# Patient Record
Sex: FEMALE | Race: WHITE | Employment: FULL TIME | URBAN - METROPOLITAN AREA
[De-identification: names, ages, dates, MRNs, and addresses within clinical notes are randomized per-mention and may not be internally consistent; named-entity substitution may affect disease eponyms.]

---

## 2018-07-30 ENCOUNTER — HOSPITAL ENCOUNTER (EMERGENCY)
Age: 33
Discharge: HOME | End: 2018-07-30
Attending: EMERGENCY MEDICINE
Payer: MEDICARE

## 2018-07-30 VITALS
OXYGEN SATURATION: 98 % | BODY MASS INDEX: 18.78 KG/M2 | RESPIRATION RATE: 16 BRPM | TEMPERATURE: 98.8 F | SYSTOLIC BLOOD PRESSURE: 106 MMHG | WEIGHT: 110 LBS | HEIGHT: 64 IN | DIASTOLIC BLOOD PRESSURE: 67 MMHG | HEART RATE: 80 BPM

## 2018-07-30 DIAGNOSIS — T40.1X1A ACCIDENTAL OVERDOSE OF HEROIN, INITIAL ENCOUNTER (HCC): Primary | ICD-10-CM

## 2018-07-30 PROCEDURE — 6370000000 HC RX 637 (ALT 250 FOR IP): Performed by: EMERGENCY MEDICINE

## 2018-07-30 PROCEDURE — 99284 EMERGENCY DEPT VISIT MOD MDM: CPT

## 2018-07-30 RX ORDER — NAPROXEN 250 MG/1
500 TABLET ORAL ONCE
Status: COMPLETED | OUTPATIENT
Start: 2018-07-30 | End: 2018-07-30

## 2018-07-30 RX ADMIN — NAPROXEN 500 MG: 250 TABLET ORAL at 20:26

## 2018-07-30 ASSESSMENT — PAIN DESCRIPTION - PAIN TYPE: TYPE: ACUTE PAIN

## 2018-07-30 ASSESSMENT — PAIN DESCRIPTION - ORIENTATION: ORIENTATION: RIGHT;POSTERIOR

## 2018-07-30 ASSESSMENT — PAIN DESCRIPTION - FREQUENCY: FREQUENCY: CONTINUOUS

## 2018-07-30 ASSESSMENT — PAIN SCALES - GENERAL
PAINLEVEL_OUTOF10: 9
PAINLEVEL_OUTOF10: 9

## 2018-07-30 ASSESSMENT — PAIN DESCRIPTION - LOCATION: LOCATION: HEAD

## 2018-07-30 ASSESSMENT — PAIN DESCRIPTION - DESCRIPTORS: DESCRIPTORS: SHARP

## 2018-07-30 NOTE — ED PROVIDER NOTES
tablet by mouth every 8 hours as needed for Nausea or Vomiting 15 tablet 0    Naltrexone (VIVITROL IM) Inject into the muscle every 30 days      mirtazapine (REMERON) 15 MG tablet Take 15 mg by mouth nightly      traZODone (DESYREL) 50 MG tablet Take 50 mg by mouth nightly       No Known Allergies    REVIEW OF SYSTEMS  10 systems reviewed, pertinent positives per HPI otherwise noted to be negative. PHYSICAL EXAM  There were no vitals taken for this visit. GENERAL APPEARANCE: Awake and alert. Cooperative. No acute distress. HEAD: Normocephalic. Atraumatic. EYES: PERRL. EOM's grossly intact. ENT: Mucous membranes are moist.   NECK: Supple. HEART: RRR. LUNGS: Respirations unlabored. CTAB. Good air exchange. Speaking comfortably in full sentences. BACK: No midline spinal tenderness or step-off. ABDOMEN: Soft. Non-distended. Non-tender. No guarding or rebound. Normal bowel sounds. EXTREMITIES: Mild raised area on R wrist, no erythema or streaking, no tenderness, no bruising. No peripheral edema. Moves all extremities equally. All extremities neurovascularly intact. : Deferred  SKIN: Warm and dry. No acute rashes. NEUROLOGICAL: Alert and oriented. No gross facial drooping. Strength 5/5, sensation intact. Normal coordination. Gait normal.   PSYCHIATRIC: Normal mood and affect. ED COURSE/MDM  Patient seen and evaluated. Pt feeling better now except for H/A, will give naproxen. VSS. Pt feels well enough to go home, will get Leighann Poplin to her house and she will go for her inpt txt starting tomorrow. No acute issues, nontoxic appearing in no distress. Plan of care discussed with patient. Patient in agreement with plan. I told the patient they can come back to the ER at any time if the S/S persist or worsen or they have any other S/S of concern. New Prescriptions    No medications on file       CLINICAL IMPRESSION  1.  Accidental overdose of heroin, initial encounter        Rory Martinez

## 2020-04-11 ENCOUNTER — APPOINTMENT (OUTPATIENT)
Dept: GENERAL RADIOLOGY | Age: 35
End: 2020-04-11
Payer: MEDICAID

## 2020-04-11 ENCOUNTER — APPOINTMENT (OUTPATIENT)
Dept: CT IMAGING | Age: 35
End: 2020-04-11
Payer: MEDICAID

## 2020-04-11 ENCOUNTER — HOSPITAL ENCOUNTER (EMERGENCY)
Age: 35
Discharge: HOME | End: 2020-04-11
Payer: MEDICAID

## 2020-04-11 VITALS
RESPIRATION RATE: 18 BRPM | HEART RATE: 74 BPM | SYSTOLIC BLOOD PRESSURE: 94 MMHG | BODY MASS INDEX: 22.2 KG/M2 | DIASTOLIC BLOOD PRESSURE: 63 MMHG | OXYGEN SATURATION: 98 % | WEIGHT: 130 LBS | TEMPERATURE: 98 F | HEIGHT: 64 IN

## 2020-04-11 PROCEDURE — 73560 X-RAY EXAM OF KNEE 1 OR 2: CPT

## 2020-04-11 PROCEDURE — 99284 EMERGENCY DEPT VISIT MOD MDM: CPT

## 2020-04-11 PROCEDURE — 71250 CT THORAX DX C-: CPT

## 2020-04-11 PROCEDURE — 6370000000 HC RX 637 (ALT 250 FOR IP): Performed by: PHYSICIAN ASSISTANT

## 2020-04-11 PROCEDURE — 73590 X-RAY EXAM OF LOWER LEG: CPT

## 2020-04-11 PROCEDURE — 73030 X-RAY EXAM OF SHOULDER: CPT

## 2020-04-11 RX ORDER — HYDROCODONE BITARTRATE AND ACETAMINOPHEN 5; 325 MG/1; MG/1
1 TABLET ORAL ONCE
Status: COMPLETED | OUTPATIENT
Start: 2020-04-11 | End: 2020-04-11

## 2020-04-11 RX ORDER — ACETAMINOPHEN 325 MG/1
650 TABLET ORAL ONCE
Status: COMPLETED | OUTPATIENT
Start: 2020-04-11 | End: 2020-04-11

## 2020-04-11 RX ORDER — HYDROCODONE BITARTRATE AND ACETAMINOPHEN 5; 325 MG/1; MG/1
1 TABLET ORAL EVERY 6 HOURS PRN
Qty: 12 TABLET | Refills: 0 | Status: SHIPPED | OUTPATIENT
Start: 2020-04-11 | End: 2020-04-14

## 2020-04-11 RX ADMIN — ACETAMINOPHEN 650 MG: 325 TABLET, FILM COATED ORAL at 20:04

## 2020-04-11 RX ADMIN — HYDROCODONE BITARTRATE AND ACETAMINOPHEN 1 TABLET: 5; 325 TABLET ORAL at 21:17

## 2020-04-11 ASSESSMENT — PAIN DESCRIPTION - FREQUENCY
FREQUENCY: CONTINUOUS
FREQUENCY: CONTINUOUS

## 2020-04-11 ASSESSMENT — PAIN DESCRIPTION - DESCRIPTORS
DESCRIPTORS: ACHING
DESCRIPTORS: ACHING

## 2020-04-11 ASSESSMENT — ENCOUNTER SYMPTOMS
VOMITING: 0
SHORTNESS OF BREATH: 0
NAUSEA: 0
CHEST TIGHTNESS: 0
ABDOMINAL PAIN: 0
BACK PAIN: 0

## 2020-04-11 ASSESSMENT — PAIN SCALES - GENERAL
PAINLEVEL_OUTOF10: 10
PAINLEVEL_OUTOF10: 8
PAINLEVEL_OUTOF10: 8
PAINLEVEL_OUTOF10: 10

## 2020-04-11 ASSESSMENT — PAIN DESCRIPTION - PROGRESSION
CLINICAL_PROGRESSION: GRADUALLY WORSENING
CLINICAL_PROGRESSION: GRADUALLY WORSENING

## 2020-04-11 ASSESSMENT — PAIN DESCRIPTION - LOCATION
LOCATION: ARM;BACK;RIB CAGE
LOCATION: SHOULDER

## 2020-04-11 ASSESSMENT — PAIN - FUNCTIONAL ASSESSMENT
PAIN_FUNCTIONAL_ASSESSMENT: PREVENTS OR INTERFERES WITH MANY ACTIVE NOT PASSIVE ACTIVITIES
PAIN_FUNCTIONAL_ASSESSMENT: PREVENTS OR INTERFERES WITH MANY ACTIVE NOT PASSIVE ACTIVITIES

## 2020-04-11 ASSESSMENT — PAIN DESCRIPTION - ORIENTATION
ORIENTATION: RIGHT
ORIENTATION: RIGHT

## 2020-04-11 ASSESSMENT — PAIN DESCRIPTION - PAIN TYPE
TYPE: ACUTE PAIN
TYPE: ACUTE PAIN

## 2020-04-11 ASSESSMENT — PAIN DESCRIPTION - ONSET
ONSET: ON-GOING
ONSET: ON-GOING
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
## 2020-04-11 NOTE — ED NOTES
Bed: S-46  Expected date: 4/11/20  Expected time:   Means of arrival: [facility]  Comments:  29 Y. [address], RN  04/11/20 6047

## 2024-01-02 ENCOUNTER — APPOINTMENT (OUTPATIENT)
Dept: GENERAL RADIOLOGY | Age: 39
DRG: 392 | End: 2024-01-02
Payer: COMMERCIAL

## 2024-01-02 ENCOUNTER — HOSPITAL ENCOUNTER (INPATIENT)
Age: 39
LOS: 1 days | Discharge: HOME | DRG: 392 | End: 2024-01-03
Attending: EMERGENCY MEDICINE | Admitting: INTERNAL MEDICINE
Payer: COMMERCIAL

## 2024-01-02 ENCOUNTER — APPOINTMENT (OUTPATIENT)
Dept: CT IMAGING | Age: 39
DRG: 392 | End: 2024-01-02
Payer: COMMERCIAL

## 2024-01-02 DIAGNOSIS — I48.91 NEW ONSET ATRIAL FIBRILLATION (HCC): Primary | ICD-10-CM

## 2024-01-02 DIAGNOSIS — R10.13 ABDOMINAL PAIN, EPIGASTRIC: ICD-10-CM

## 2024-01-02 DIAGNOSIS — E87.6 HYPOKALEMIA: ICD-10-CM

## 2024-01-02 DIAGNOSIS — R11.2 NAUSEA AND VOMITING, UNSPECIFIED VOMITING TYPE: ICD-10-CM

## 2024-01-02 LAB
ALBUMIN SERPL-MCNC: 4.1 G/DL (ref 3.4–5)
ALBUMIN/GLOB SERPL: 1.5 {RATIO} (ref 1.1–2.2)
ALP SERPL-CCNC: 98 U/L (ref 40–129)
ALT SERPL-CCNC: 12 U/L (ref 10–40)
AMPHETAMINES UR QL SCN>1000 NG/ML: ABNORMAL
ANION GAP SERPL CALCULATED.3IONS-SCNC: 12 MMOL/L (ref 3–16)
AST SERPL-CCNC: 21 U/L (ref 15–37)
BACTERIA URNS QL MICRO: ABNORMAL /HPF
BARBITURATES UR QL SCN>200 NG/ML: ABNORMAL
BASOPHILS # BLD: 0 K/UL (ref 0–0.2)
BASOPHILS NFR BLD: 0.6 %
BENZODIAZ UR QL SCN>200 NG/ML: ABNORMAL
BILIRUB SERPL-MCNC: 0.4 MG/DL (ref 0–1)
BILIRUB UR QL STRIP.AUTO: NEGATIVE
BUN SERPL-MCNC: 7 MG/DL (ref 7–20)
CALCIUM SERPL-MCNC: 8.7 MG/DL (ref 8.3–10.6)
CANNABINOIDS UR QL SCN>50 NG/ML: POSITIVE
CHLORIDE SERPL-SCNC: 101 MMOL/L (ref 99–110)
CLARITY UR: ABNORMAL
CO2 SERPL-SCNC: 23 MMOL/L (ref 21–32)
COCAINE UR QL SCN: ABNORMAL
COLOR UR: YELLOW
CREAT SERPL-MCNC: 0.8 MG/DL (ref 0.6–1.1)
DEPRECATED RDW RBC AUTO: 15.8 % (ref 12.4–15.4)
DRUG SCREEN COMMENT UR-IMP: ABNORMAL
EKG ATRIAL RATE: 107 BPM
EKG ATRIAL RATE: 61 BPM
EKG DIAGNOSIS: NORMAL
EKG DIAGNOSIS: NORMAL
EKG P AXIS: -58 DEGREES
EKG P-R INTERVAL: 94 MS
EKG Q-T INTERVAL: 302 MS
EKG Q-T INTERVAL: 452 MS
EKG QRS DURATION: 72 MS
EKG QRS DURATION: 72 MS
EKG QTC CALCULATION (BAZETT): 455 MS
EKG QTC CALCULATION (BAZETT): 464 MS
EKG R AXIS: -10 DEGREES
EKG R AXIS: 3 DEGREES
EKG T AXIS: -1 DEGREES
EKG T AXIS: -39 DEGREES
EKG VENTRICULAR RATE: 142 BPM
EKG VENTRICULAR RATE: 61 BPM
EOSINOPHIL # BLD: 0.1 K/UL (ref 0–0.6)
EOSINOPHIL NFR BLD: 2.1 %
EPI CELLS #/AREA URNS HPF: ABNORMAL /HPF (ref 0–5)
FENTANYL SCREEN, URINE: ABNORMAL
GFR SERPLBLD CREATININE-BSD FMLA CKD-EPI: >60 ML/MIN/{1.73_M2}
GLUCOSE SERPL-MCNC: 109 MG/DL (ref 70–99)
GLUCOSE UR STRIP.AUTO-MCNC: NEGATIVE MG/DL
HCG UR QL: NEGATIVE
HCT VFR BLD AUTO: 35.5 % (ref 36–48)
HGB BLD-MCNC: 11.9 G/DL (ref 12–16)
HGB UR QL STRIP.AUTO: ABNORMAL
IRON SATN MFR SERPL: 22 % (ref 15–50)
IRON SERPL-MCNC: 83 UG/DL (ref 37–145)
KETONES UR STRIP.AUTO-MCNC: NEGATIVE MG/DL
LEUKOCYTE ESTERASE UR QL STRIP.AUTO: NEGATIVE
LIPASE SERPL-CCNC: 69 U/L (ref 13–60)
LYMPHOCYTES # BLD: 0.7 K/UL (ref 1–5.1)
LYMPHOCYTES NFR BLD: 19 %
MAGNESIUM SERPL-MCNC: 1.8 MG/DL (ref 1.8–2.4)
MCH RBC QN AUTO: 31.9 PG (ref 26–34)
MCHC RBC AUTO-ENTMCNC: 33.4 G/DL (ref 31–36)
MCV RBC AUTO: 95.4 FL (ref 80–100)
METHADONE UR QL SCN>300 NG/ML: ABNORMAL
MONOCYTES # BLD: 0.5 K/UL (ref 0–1.3)
MONOCYTES NFR BLD: 13.6 %
MUCOUS THREADS #/AREA URNS LPF: ABNORMAL /LPF
NEUTROPHILS # BLD: 2.5 K/UL (ref 1.7–7.7)
NEUTROPHILS NFR BLD: 64.7 %
NITRITE UR QL STRIP.AUTO: NEGATIVE
OPIATES UR QL SCN>300 NG/ML: ABNORMAL
OXYCODONE UR QL SCN: ABNORMAL
PCP UR QL SCN>25 NG/ML: ABNORMAL
PH UR STRIP.AUTO: 6 [PH] (ref 5–8)
PH UR STRIP: 5 [PH]
PLATELET # BLD AUTO: 172 K/UL (ref 135–450)
PMV BLD AUTO: 8.2 FL (ref 5–10.5)
POTASSIUM SERPL-SCNC: 3.3 MMOL/L (ref 3.5–5.1)
PROT SERPL-MCNC: 6.9 G/DL (ref 6.4–8.2)
PROT UR STRIP.AUTO-MCNC: 30 MG/DL
RBC # BLD AUTO: 3.72 M/UL (ref 4–5.2)
RBC #/AREA URNS HPF: ABNORMAL /HPF (ref 0–4)
SODIUM SERPL-SCNC: 136 MMOL/L (ref 136–145)
SP GR UR STRIP.AUTO: >=1.03 (ref 1–1.03)
TIBC SERPL-MCNC: 386 UG/DL (ref 260–445)
TROPONIN, HIGH SENSITIVITY: 10 NG/L (ref 0–14)
TROPONIN, HIGH SENSITIVITY: 7 NG/L (ref 0–14)
TROPONIN, HIGH SENSITIVITY: 7 NG/L (ref 0–14)
TSH SERPL DL<=0.005 MIU/L-ACNC: 2.83 UIU/ML (ref 0.27–4.2)
UA COMPLETE W REFLEX CULTURE PNL UR: ABNORMAL
UA DIPSTICK W REFLEX MICRO PNL UR: YES
URN SPEC COLLECT METH UR: ABNORMAL
UROBILINOGEN UR STRIP-ACNC: 0.2 E.U./DL
WBC # BLD AUTO: 3.8 K/UL (ref 4–11)
WBC #/AREA URNS HPF: ABNORMAL /HPF (ref 0–5)

## 2024-01-02 PROCEDURE — 93005 ELECTROCARDIOGRAM TRACING: CPT | Performed by: INTERNAL MEDICINE

## 2024-01-02 PROCEDURE — 83735 ASSAY OF MAGNESIUM: CPT

## 2024-01-02 PROCEDURE — 2580000003 HC RX 258

## 2024-01-02 PROCEDURE — 96374 THER/PROPH/DIAG INJ IV PUSH: CPT

## 2024-01-02 PROCEDURE — 80307 DRUG TEST PRSMV CHEM ANLYZR: CPT

## 2024-01-02 PROCEDURE — 99223 1ST HOSP IP/OBS HIGH 75: CPT

## 2024-01-02 PROCEDURE — 6360000002 HC RX W HCPCS: Performed by: EMERGENCY MEDICINE

## 2024-01-02 PROCEDURE — C9113 INJ PANTOPRAZOLE SODIUM, VIA: HCPCS

## 2024-01-02 PROCEDURE — 93005 ELECTROCARDIOGRAM TRACING: CPT | Performed by: EMERGENCY MEDICINE

## 2024-01-02 PROCEDURE — 80053 COMPREHEN METABOLIC PANEL: CPT

## 2024-01-02 PROCEDURE — 93010 ELECTROCARDIOGRAM REPORT: CPT | Performed by: INTERNAL MEDICINE

## 2024-01-02 PROCEDURE — 81001 URINALYSIS AUTO W/SCOPE: CPT

## 2024-01-02 PROCEDURE — 84443 ASSAY THYROID STIM HORMONE: CPT

## 2024-01-02 PROCEDURE — 6360000002 HC RX W HCPCS

## 2024-01-02 PROCEDURE — 96372 THER/PROPH/DIAG INJ SC/IM: CPT

## 2024-01-02 PROCEDURE — 2060000000 HC ICU INTERMEDIATE R&B

## 2024-01-02 PROCEDURE — 2500000003 HC RX 250 WO HCPCS: Performed by: EMERGENCY MEDICINE

## 2024-01-02 PROCEDURE — 87186 SC STD MICRODIL/AGAR DIL: CPT

## 2024-01-02 PROCEDURE — 71046 X-RAY EXAM CHEST 2 VIEWS: CPT

## 2024-01-02 PROCEDURE — 84484 ASSAY OF TROPONIN QUANT: CPT

## 2024-01-02 PROCEDURE — 83690 ASSAY OF LIPASE: CPT

## 2024-01-02 PROCEDURE — 83540 ASSAY OF IRON: CPT

## 2024-01-02 PROCEDURE — 6370000000 HC RX 637 (ALT 250 FOR IP)

## 2024-01-02 PROCEDURE — 87086 URINE CULTURE/COLONY COUNT: CPT

## 2024-01-02 PROCEDURE — 36415 COLL VENOUS BLD VENIPUNCTURE: CPT

## 2024-01-02 PROCEDURE — 2580000003 HC RX 258: Performed by: EMERGENCY MEDICINE

## 2024-01-02 PROCEDURE — 84703 CHORIONIC GONADOTROPIN ASSAY: CPT

## 2024-01-02 PROCEDURE — 87077 CULTURE AEROBIC IDENTIFY: CPT

## 2024-01-02 PROCEDURE — 99285 EMERGENCY DEPT VISIT HI MDM: CPT

## 2024-01-02 PROCEDURE — 85025 COMPLETE CBC W/AUTO DIFF WBC: CPT

## 2024-01-02 PROCEDURE — 99254 IP/OBS CNSLTJ NEW/EST MOD 60: CPT | Performed by: INTERNAL MEDICINE

## 2024-01-02 PROCEDURE — 74176 CT ABD & PELVIS W/O CONTRAST: CPT

## 2024-01-02 PROCEDURE — 83550 IRON BINDING TEST: CPT

## 2024-01-02 RX ORDER — 0.9 % SODIUM CHLORIDE 0.9 %
1000 INTRAVENOUS SOLUTION INTRAVENOUS ONCE
Status: COMPLETED | OUTPATIENT
Start: 2024-01-02 | End: 2024-01-02

## 2024-01-02 RX ORDER — SODIUM CHLORIDE 0.9 % (FLUSH) 0.9 %
5-40 SYRINGE (ML) INJECTION EVERY 12 HOURS SCHEDULED
Status: DISCONTINUED | OUTPATIENT
Start: 2024-01-02 | End: 2024-01-03 | Stop reason: HOSPADM

## 2024-01-02 RX ORDER — POLYETHYLENE GLYCOL 3350 17 G/17G
17 POWDER, FOR SOLUTION ORAL DAILY PRN
Status: DISCONTINUED | OUTPATIENT
Start: 2024-01-02 | End: 2024-01-03 | Stop reason: HOSPADM

## 2024-01-02 RX ORDER — SODIUM CHLORIDE, SODIUM LACTATE, POTASSIUM CHLORIDE, CALCIUM CHLORIDE 600; 310; 30; 20 MG/100ML; MG/100ML; MG/100ML; MG/100ML
INJECTION, SOLUTION INTRAVENOUS CONTINUOUS
Status: DISCONTINUED | OUTPATIENT
Start: 2024-01-02 | End: 2024-01-03 | Stop reason: HOSPADM

## 2024-01-02 RX ORDER — ENOXAPARIN SODIUM 100 MG/ML
1 INJECTION SUBCUTANEOUS 2 TIMES DAILY
Status: DISCONTINUED | OUTPATIENT
Start: 2024-01-02 | End: 2024-01-03

## 2024-01-02 RX ORDER — METOCLOPRAMIDE HYDROCHLORIDE 5 MG/ML
10 INJECTION INTRAMUSCULAR; INTRAVENOUS ONCE
Status: COMPLETED | OUTPATIENT
Start: 2024-01-02 | End: 2024-01-02

## 2024-01-02 RX ORDER — ONDANSETRON 4 MG/1
4 TABLET, ORALLY DISINTEGRATING ORAL EVERY 8 HOURS PRN
Status: DISCONTINUED | OUTPATIENT
Start: 2024-01-02 | End: 2024-01-03 | Stop reason: HOSPADM

## 2024-01-02 RX ORDER — ONDANSETRON 2 MG/ML
4 INJECTION INTRAMUSCULAR; INTRAVENOUS EVERY 6 HOURS PRN
Status: DISCONTINUED | OUTPATIENT
Start: 2024-01-02 | End: 2024-01-03 | Stop reason: HOSPADM

## 2024-01-02 RX ORDER — SODIUM CHLORIDE 9 MG/ML
INJECTION, SOLUTION INTRAVENOUS PRN
Status: DISCONTINUED | OUTPATIENT
Start: 2024-01-02 | End: 2024-01-03 | Stop reason: HOSPADM

## 2024-01-02 RX ORDER — HALOPERIDOL 5 MG/ML
5 INJECTION INTRAMUSCULAR ONCE
Status: COMPLETED | OUTPATIENT
Start: 2024-01-02 | End: 2024-01-02

## 2024-01-02 RX ORDER — SODIUM CHLORIDE 0.9 % (FLUSH) 0.9 %
10 SYRINGE (ML) INJECTION PRN
Status: DISCONTINUED | OUTPATIENT
Start: 2024-01-02 | End: 2024-01-03 | Stop reason: HOSPADM

## 2024-01-02 RX ORDER — DILTIAZEM HYDROCHLORIDE 5 MG/ML
10 INJECTION INTRAVENOUS ONCE
Status: COMPLETED | OUTPATIENT
Start: 2024-01-02 | End: 2024-01-02

## 2024-01-02 RX ORDER — DILTIAZEM HYDROCHLORIDE 5 MG/ML
10 INJECTION INTRAVENOUS ONCE
Status: DISCONTINUED | OUTPATIENT
Start: 2024-01-02 | End: 2024-01-02

## 2024-01-02 RX ORDER — POTASSIUM CHLORIDE 7.45 MG/ML
10 INJECTION INTRAVENOUS ONCE
Status: COMPLETED | OUTPATIENT
Start: 2024-01-02 | End: 2024-01-02

## 2024-01-02 RX ORDER — ACETAMINOPHEN 650 MG/1
650 SUPPOSITORY RECTAL EVERY 6 HOURS PRN
Status: DISCONTINUED | OUTPATIENT
Start: 2024-01-02 | End: 2024-01-03 | Stop reason: HOSPADM

## 2024-01-02 RX ORDER — PANTOPRAZOLE SODIUM 40 MG/10ML
40 INJECTION, POWDER, LYOPHILIZED, FOR SOLUTION INTRAVENOUS DAILY
Status: DISCONTINUED | OUTPATIENT
Start: 2024-01-02 | End: 2024-01-03 | Stop reason: HOSPADM

## 2024-01-02 RX ORDER — ACETAMINOPHEN 325 MG/1
650 TABLET ORAL EVERY 6 HOURS PRN
Status: DISCONTINUED | OUTPATIENT
Start: 2024-01-02 | End: 2024-01-03 | Stop reason: HOSPADM

## 2024-01-02 RX ADMIN — DILTIAZEM HYDROCHLORIDE 10 MG: 5 INJECTION, SOLUTION INTRAVENOUS at 08:31

## 2024-01-02 RX ADMIN — METOCLOPRAMIDE 10 MG: 5 INJECTION, SOLUTION INTRAMUSCULAR; INTRAVENOUS at 09:31

## 2024-01-02 RX ADMIN — PANTOPRAZOLE SODIUM 40 MG: 40 INJECTION, POWDER, FOR SOLUTION INTRAVENOUS at 16:57

## 2024-01-02 RX ADMIN — SODIUM CHLORIDE, POTASSIUM CHLORIDE, SODIUM LACTATE AND CALCIUM CHLORIDE: 600; 310; 30; 20 INJECTION, SOLUTION INTRAVENOUS at 17:10

## 2024-01-02 RX ADMIN — ONDANSETRON 4 MG: 4 TABLET, ORALLY DISINTEGRATING ORAL at 16:57

## 2024-01-02 RX ADMIN — SODIUM CHLORIDE 1000 ML: 9 INJECTION, SOLUTION INTRAVENOUS at 06:34

## 2024-01-02 RX ADMIN — DILTIAZEM HYDROCHLORIDE 5 MG/HR: 5 INJECTION, SOLUTION INTRAVENOUS at 09:34

## 2024-01-02 RX ADMIN — POTASSIUM CHLORIDE 10 MEQ: 7.46 INJECTION, SOLUTION INTRAVENOUS at 08:37

## 2024-01-02 RX ADMIN — SODIUM CHLORIDE 1000 ML: 9 INJECTION, SOLUTION INTRAVENOUS at 09:31

## 2024-01-02 RX ADMIN — HALOPERIDOL LACTATE 5 MG: 5 INJECTION, SOLUTION INTRAMUSCULAR at 06:57

## 2024-01-02 ASSESSMENT — LIFESTYLE VARIABLES
HOW OFTEN DO YOU HAVE A DRINK CONTAINING ALCOHOL: NEVER
HOW MANY STANDARD DRINKS CONTAINING ALCOHOL DO YOU HAVE ON A TYPICAL DAY: PATIENT DOES NOT DRINK
HOW OFTEN DO YOU HAVE A DRINK CONTAINING ALCOHOL: NEVER

## 2024-01-02 ASSESSMENT — ENCOUNTER SYMPTOMS
RHINORRHEA: 0
DIARRHEA: 0
ABDOMINAL PAIN: 1
NAUSEA: 1
COUGH: 0
VOMITING: 1
CHEST TIGHTNESS: 0
BACK PAIN: 0
SHORTNESS OF BREATH: 0

## 2024-01-02 ASSESSMENT — PAIN SCALES - GENERAL
PAINLEVEL_OUTOF10: 8
PAINLEVEL_OUTOF10: 0

## 2024-01-02 ASSESSMENT — PAIN DESCRIPTION - LOCATION: LOCATION: ABDOMEN

## 2024-01-02 ASSESSMENT — PAIN DESCRIPTION - DESCRIPTORS: DESCRIPTORS: ACHING

## 2024-01-02 NOTE — ED PROVIDER NOTES
epithelial cells, no obvious UTI.  CBC with a leukopenia to 3.8, with a normocytic anemia with a hemoglobin at 11.9.    CT abdomen shows a small amount of nonspecific pelvic free fluid, possible ruptured cyst, or pelvic infection.    On further history with patient at this time, she says her abdominal pain is significantly better.  When asked about history of A-fib, she says \"about 2 years ago I had to wear a monitor,\" because she has a history of palpitations.  She says \"I cannot really feel it when my heart races.\"  I wonder if she has paroxysmal A-fib.  At this time, we will add on troponin, chest x-ray as above, give 10 mg of diltiazem IV push for rate control and follow-up with infusion if needed, and consider admission for cardiac evaluation.  Patient is not on any anticoagulation.  Says it is a history of hypercholesterolemia, but no other concerns.  Is on medical marijuana edibles daily.  Has no concerns for any vaginal discharge, vaginal drainage, or any concerns for STIs given her CT findings.  Think she has had some variances in the past.  Has no personal cardiac history herself as far as PCI or abnormal stress or echo.      Physical Exam  Constitutional:       Appearance: She is obese. She is not ill-appearing.      Comments: Says she feels her heart racing, is about 140s to 150 here in room, but is alert, conversational, no acute respiratory distress, sits up and is able to ambulate without any difficulty.  Largely asymptomatic A-fib.   HENT:      Head: Normocephalic and atraumatic.   Cardiovascular:      Rate and Rhythm: Tachycardia present. Rhythm irregular.   Pulmonary:      Effort: Pulmonary effort is normal. No respiratory distress.      Breath sounds: No wheezing.   Abdominal:      Tenderness: There is no abdominal tenderness (resolved periumbilical abd TPP).   Genitourinary:     Cervix: Normal.   Skin:     General: Skin is warm and dry.   Neurological:      General: No focal deficit present.       Mental Status: She is alert and oriented to person, place, and time.         Review of Systems   Constitutional:  Negative for activity change, appetite change, fatigue and fever.   HENT:  Negative for congestion and rhinorrhea.    Respiratory:  Negative for cough, chest tightness and shortness of breath.    Cardiovascular:  Positive for palpitations. Negative for chest pain and leg swelling.   Gastrointestinal:  Positive for abdominal pain (resolved by my eval), nausea and vomiting. Negative for diarrhea.   Genitourinary:  Negative for dysuria, flank pain and pelvic pain.   Musculoskeletal:  Negative for back pain and neck pain.   Skin:  Negative for rash and wound.        Medications   dilTIAZem injection 10 mg (has no administration in time range)   potassium chloride 10 mEq/100 mL IVPB (Peripheral Line) (has no administration in time range)   sodium chloride 0.9 % bolus 1,000 mL (1,000 mLs IntraVENous New Bag 1/2/24 8441)   haloperidol lactate (HALDOL) injection 5 mg (5 mg IntraMUSCular Given 1/2/24 0632)         MDM:   Overall, this was a signed out patient to me, 30-year-old female, on marijuana edibles daily, presenting with department today with nausea and periumbilical abdominal pain.  Had already gotten Haldol by time I showed up to the emergency department to take signout.  Patient had been hemodynamically stable, but as above, at about 2 hours of being in the ER, she developed A-fib with RVR.  Says she feels her heart racing, but is otherwise in no acute clinical distress.    Initial management had included IV fluids and haloperidol by outgoing ED physician.    Added on cardiac workup.  Additionally, added 10 mg of diltiazem to see response, may start gtt. if needed.  Will replace potassium IV.  She no longer has abdominal pain or nausea vomiting here in the emergency department.  No obvious infectious process right now.  8:03 AM EST    [id number] 2 score: 1     8:44 AM EST  Patient responded to the

## 2024-01-02 NOTE — CARE COORDINATION
Patient admitted with an anticipated short hospitalization length of stay. Chart reviewed and it appears that patient has minimal needs for discharge at this time. Please reach out to case management if any discharge needs are identified.     *Case management will continue to follow progress and update discharge plan as needed.

## 2024-01-02 NOTE — PLAN OF CARE
A fib RVR     Possible cannabis hyperemesis     PCU admit     Cardizem gtt     DARRON Newman  01/02/24  9:05 AM

## 2024-01-02 NOTE — PROGRESS NOTES
Charge RN attempted to assign pt to this RN with Cardizem drip. Charge RN informed this RN cannot accept the pt d/t the titratable drip. Assignment switched

## 2024-01-02 NOTE — PROGRESS NOTES
New Telemetry box number assigned to Patient      To be filled out by [facility]    Patient assigned to tele box number: __________________               (to be written in by [facility] when telemetry box is assigned to patient)    ___________________________________________________________________________      Bedside RN confirming that the box listed above is in fact the telemetry box number being placed on the patient listed above.        X________________________________________ RN signature        __________________________________________RN assigned to Patient (please print)    _______________ Date    ____________ Time

## 2024-01-02 NOTE — H&P
[facility] Medicine History & Physical      PCP: Velasquez Garciacesarnorm    Date of Admission: 1/2/2024    Date of Service: Pt seen/examined on 01/02/24      Chief Complaint:    Chief Complaint   Patient presents with    Abdominal Pain     Woke up at 4am with severe abd pain. States this has been a chronic issue but this morning has just gotten worse.          History Of Present Illness:      The patient is a 38 y.o. female who presented to [facility] ED with complaint of nausea and vomiting. Patient reports sudden onset of bilious, non-bloody vomiting at 4 AM this morning. Woke her from sleep. Per ED note, she endorsed severe abdominal pain, however, patient denies any recent abdominal pain to me personally. Denies fever, chills, constipation, diarrhea, dysuria, hematuria, vaginal discharge, chest pain, palpitations or SOB. No sick contacts or diet changes. No tobacco or alcohol use. She does use marijuana edibles on a daily basis. Workup in ED significant for new onset atrial fibrillation with rapid ventricular response, and intractable nausea and vomiting of unclear etiology. Admitted for further evaluation and management in consultation with cardiology and GI.        Past Medical History:        Diagnosis Date    Pancreatitis        Past Surgical History:        Procedure Laterality Date    BREAST ENHANCEMENT SURGERY      BUNIONECTOMY      CHOLECYSTECTOMY      TUBAL LIGATION         Medications Prior to Admission:    Prior to Admission medications    Medication Sig Start Date End Date Taking? Authorizing Provider   naproxen (NAPROSYN) 500 MG tablet Take 1 tablet by mouth 2 times daily 10/11/17   Alejandro Aly PA   ondansetron (ZOFRAN ODT) 4 MG disintegrating tablet Take 1 tablet by mouth every 8 hours as needed for Nausea or Vomiting 10/11/17   Alejandro Aly PA   Naltrexone (VIVITROL IM) Inject into the muscle every 30 days    Provider, MD Funmi   mirtazapine (REMERON) 15 MG tablet Take 15 mg by mouth nightly

## 2024-01-02 NOTE — CONSULTS
Gastroenterology Consult Note    Patient:   Lourdes Mojica   :    1985   Facility:   [facility]  Referring/PCP: Chava Garcia  Date:     2024  Consultant:   CORINNE Gray CNP      Chief Complaint   Patient presents with    Abdominal Pain     Woke up at 4am with severe abd pain. States this has been a chronic issue but this morning has just gotten worse.         History of Present illness   Pt. Is a 39 yo female with pmx of pancreatitis, substance abuse, chronic Hepatitis C, FRANCISCO, and CCY who presented to ED 24 with c/o nausea, vomiting, and abdominal pain. Symptoms awoke her this morning. Her pain is located epigastric/mid abdomen without radiation. She reports nonbloody emesis. She has diarrhea chronically at baseline. She denies hematochezia. She denies sick contacts, fever, chills, medication or dietary changes. She was noted to be in A.fib RVR in ED. She has no history of A.fib. In the past, she was seen by GI at [facility] for recurrent pancreatitis including workup of EGD, EUS, and ERCP in  that were reportedly normal. We do not have operative records of this. She admits to daily marijuana use and tobacco use. She denies NSAID use. She has history of Heroin abuse.     Past Medical History:   Diagnosis Date    Pancreatitis      Past Surgical History:   Procedure Laterality Date    BREAST ENHANCEMENT SURGERY      BUNIONECTOMY      CHOLECYSTECTOMY      TUBAL LIGATION         Social:   Social History     Tobacco Use    Smoking status: Every Day     Current packs/day: 0.50     Types: Cigarettes    Smokeless tobacco: Never   Substance Use Topics    Alcohol use: No     Family: No family history on file.  No current facility-administered medications on file prior to encounter.     Current Outpatient Medications on File Prior to Encounter   Medication Sig Dispense Refill    naproxen (NAPROSYN) 500 MG tablet Take 1 tablet by mouth 2 times daily 30 tablet 0     the right lower lobe.  Recommendations as below per the Fleischner society guidelines    Attending Supervising Physician's Attestation Statement  The patient is a 38 y.o. female. I have performed a history and physical examination of the patient. I discussed the case with GABBY Simms    I reviewed the patient's Past Medical History, Past Surgical History, Medications, and Allergies.     Physical Exam:  Vitals:    01/02/24 1515 01/02/24 1530 01/02/24 1545 01/02/24 1638   BP:    113/78   Pulse: 70 78 68 58   Resp: 25 22 17 18   Temp:    98.6 °F (37 °C)   TempSrc:    Oral   SpO2:    98%   Weight:    65.8 kg (145 lb)   Height:    1.626 m (5' 4\")       Physical Examination:   General - well hydrated and in no distress  Mental status - alert, oriented to person, place, and time  Eyes - sclera anicteric  Neck - supple, no significant adenopathy  Heart - normal rate and regular rhythm  Abdomen - soft, NT, ND  Extremities - no pedal edema        Assessment:   39 yo female with pmx of pancreatitis, FRANCISCO, substance abuse, chronic hepatitis C, and CCY admitted with c/o intractable nausea and vomiting likely secondary to hyperemesis cannabis, gastroenteritis, or PUD.     Plan:   Continue supportive care  Check stool tests   PPI daily  NPO--ok for clears as tolerated   Echo pending for new onset A.fib RVR  Monitor and replenish electrolytes as needed  Pain management and antiemetics as needed  Will consider diagnostic EGD if remains symptomatic with cardiac clearance     Kelley Simms, CORINNE - CNP  2:23 PM 1/2/2024                      38 year old female with history of anxiety, substance abuse, remote of questionable pancreatitis admitted with new onset A fib with RVR. Her N/V and diarrhea are likely secondary to gastroenteritis superimposed on hyperemesis cannabis    Continue supportive care. PPI daily. Check stool tests. Diltiazem drip per cardiology. Will follow    Bon Mccrary MD          (O)

## 2024-01-02 NOTE — PROGRESS NOTES
Patient admitted to room 326 from ED. Patient oriented to room, call light, bed rails, phone, lights and bathroom. Patient instructed about the schedule of the day including: vital sign frequency, lab draws, possible tests, frequency of MD and staff rounds, daily weights, I &O's and prescribed diet.  Telemetry box in place, patient aware of placement and reason. Bed locked, in lowest position, side rails up 2/4, call light within reach.        Recliner Assessment  Patient is able to demonstrate the ability to move from a reclining position to an upright position within the recliner.       4 Eyes Skin Assessment     NAME:  Lourdes Mojica  YOB: 1985  MEDICAL RECORD NUMBER:  [id number]    The patient is being assessed for  Admission    I agree that at least one RN has performed a thorough Head to Toe Skin Assessment on the patient. ALL assessment sites listed below have been assessed.      Areas assessed by both nurses:    Other Pt refused skin assessment        Does the Patient have a Wound? No noted wound(s) Pt states no wounds.        Deon Prevention initiated by RN: No  Wound Care Orders initiated by RN: No    Pressure Injury (Stage 3,4, Unstageable, DTI, NWPT, and Complex wounds) if present, place Wound referral order by RN under : No    New Ostomies, if present place, Ostomy referral order under : No     Nurse 1 eSignature: Electronically signed by Isela Campos RN on 1/2/24 at 5:12 PM EST    **SHARE this note so that the co-signing nurse can place an eSignature**    Nurse 2 eSignature: {Esignature:[id number]}

## 2024-01-02 NOTE — PLAN OF CARE
Problem: Discharge Planning  Goal: Discharge to home or other facility with appropriate resources  Outcome: Progressing     Problem: Pain  Goal: Verbalizes/displays adequate comfort level or baseline comfort level  Outcome: Progressing     Problem: Safety - Adult  Goal: Free from fall injury  Outcome: Progressing     Problem: ABCDS Injury Assessment  Goal: Absence of physical injury  Outcome: Progressing

## 2024-01-02 NOTE — ED PROVIDER NOTES
[facility] ED  EMERGENCY DEPARTMENT ENCOUNTER      Pt Name: Lourdes Mojica  MRN: [id number]  Birthdate 1985  Date of evaluation: 1/2/2024  Provider: Kim Clemente MD    CHIEF COMPLAINT       Chief Complaint   Patient presents with    Abdominal Pain     Woke up at 4am with severe abd pain. States this has been a chronic issue but this morning has just gotten worse.          HISTORY OF PRESENT ILLNESS   (Location/Symptom, Timing/Onset, Context/Setting, Quality, Duration, Modifying Factors, Severity)  Note limiting factors.   Lourdes Mojica is a 38 y.o. female who presents to the emergency department with abdominal pain.  Patient has a history of pancreatitis and states that they did not have an explanation for her pancreatitis.  She does have a prior history of cholecystectomy.  She uses cannabis daily.  No diarrhea or constipation.  No black or bloody emesis.  Pain is colicky but radiates to her middle back.  No hematuria but patient states that she has had multiple urinary tract infections recently and has been on several rounds of antibiotics.  The pain has been intermittent and then this morning at about 4 AM she awoke with severe pain.  No cough or shortness of breath.  No chest pain.  No fevers.  No rash.  No vaginal discharge.        Nursing Notes were reviewed.    REVIEW OF SYSTEMS    (2-9 systems for level 4, 10 or more for level 5)   As per HPI    Except as noted above the remainder of the review of systems was reviewed and negative.       PAST MEDICAL HISTORY     Past Medical History:   Diagnosis Date    Pancreatitis          SURGICAL HISTORY       Past Surgical History:   Procedure Laterality Date    BREAST ENHANCEMENT SURGERY      BUNIONECTOMY      CHOLECYSTECTOMY      TUBAL LIGATION           CURRENT MEDICATIONS       Previous Medications    MIRTAZAPINE (REMERON) 15 MG TABLET    Take 15 mg by mouth nightly    NALTREXONE (VIVITROL IM)    Inject into the muscle every 30 days    NAPROXEN  nondistended.  Negative Burleson sign.  No tenderness over McBurney's point.  No rebound or guarding.  No definite masses noted.  EXTREMITIES: No clubbing, cyanosis, or edema.  No obvious deformities noted.  Calves appear equal and are nontender  SKIN: Warm, Dry, well-perfused.  No rash noted  NEURO: Alert and oriented x3.  No obvious focal neurologic deficits  PSYCH: Appropriate, cooperative      DIAGNOSTIC RESULTS       RADIOLOGY:   Non-plain film images such as CT, Ultrasound and MRI are read by the radiologist. Plain radiographic images are visualized and preliminarily interpreted by the emergency physician with the below findings:        Interpretation per the Radiologist below, if available at the time of this note:    CT ABDOMEN PELVIS WO CONTRAST Additional Contrast? None    (Results Pending)         ED BEDSIDE ULTRASOUND:   Performed by ED Physician - none    LABS:  Labs Reviewed   URINALYSIS WITH REFLEX TO CULTURE   PREGNANCY, URINE   CBC WITH AUTO DIFFERENTIAL   COMPREHENSIVE METABOLIC PANEL W/ REFLEX TO MG FOR LOW K   LIPASE   MAGNESIUM       All other labs were within normal range or not returned as of this dictation.    EMERGENCY DEPARTMENT COURSE and DIFFERENTIAL DIAGNOSIS/MDM:   Vitals:    Vitals:    01/02/24 0554 01/02/24 0555   BP:  126/71   Pulse:  74   Resp:  20   Temp:  98.1 °F (36.7 °C)   TempSrc:  Oral   SpO2:  99%   Weight: 66 kg (145 lb 9.6 oz)    Height: 1.626 m (5' 4\")        Differential diagnosis includes but is not limited to appendicitis, hernia, torsion, urinary tract infection, cystitis, kidney stone, bowel obstruction, musculoskeletal pain, metabolic abnormalities, AAA, intra-abdominal mass, cannabis hyperemesis, ectopic pregnancy.     Medical Decision Making      Patient signed out to the oncoming physician pending lab reports and CT reports.    REASSESSMENT          CRITICAL CARE TIME   None    CONSULTS:  None    PROCEDURES:  Unless otherwise noted below, none

## 2024-01-02 NOTE — CONSULTS
[facility]   CONSULTATION  [phone]        Reason for Consultation/Chief Complaint: \"I have been abdominal pain vomiting .\"  Asked to see her for afib.    History of Present Illness:  Lourdes Mojica is a 38 y.o. patient who presented to the hospital with complaints of sudden onset of abdominal pain this am that woke her up associated with vomiting.  She vomited x8 but no blood in it. Pain is resolved. Now she just feels tired and sleepy. Patient says in ED today she started feeling palpitations which were diagnosed as afib RVR. It was treated with diltiazem drip and she has converted to sinus rhythm. She has no prior history of afib. No heart disease.  She denies ever been told to have sleep apnea. She is on Trazodone at home. Note drug screen is positive for Cannabinoids.she denies taking energy drinks or excessive alcohol.  Denies chest pain, shortness of breath, edema, dizziness,  and syncope.   I have been asked to provide consultation regarding further management and testing.      Past Medical History:   has a past medical history of Pancreatitis.    Surgical History:   has a past surgical history that includes Cholecystectomy; Breast enhancement surgery; Bunionectomy; and Tubal ligation.     Social History:   reports that she has been smoking. She has never used smokeless tobacco. She reports that she does not currently use drugs after having used the following drugs: IV. She reports that she does not drink alcohol.     Family History:  Heart disease in Dad type not known to patient.      Home Medications:  Were reviewed and are listed in nursing record. and/or listed below  Prior to Admission medications    Medication Sig Start Date End Date Taking? Authorizing Provider   naproxen (NAPROSYN) 500 MG tablet Take 1 tablet by mouth 2 times daily 10/11/17   Alejandro Aly PA   ondansetron (ZOFRAN ODT) 4 MG disintegrating tablet Take 1 tablet by mouth every 8 hours as needed for Nausea or  evaluation will be based upon the patient's clinical course and testing results.    All questions and concerns were addressed to the patient/family. Alternatives to my treatment were discussed. The note was completed using EMR. Every effort was made to ensure accuracy; however, inadvertent computerized transcription errors may be present.

## 2024-01-03 VITALS
HEIGHT: 64 IN | BODY MASS INDEX: 24.86 KG/M2 | HEART RATE: 74 BPM | TEMPERATURE: 98.7 F | WEIGHT: 145.6 LBS | DIASTOLIC BLOOD PRESSURE: 73 MMHG | SYSTOLIC BLOOD PRESSURE: 119 MMHG | RESPIRATION RATE: 16 BRPM | OXYGEN SATURATION: 98 %

## 2024-01-03 PROBLEM — I48.0 PAROXYSMAL ATRIAL FIBRILLATION (HCC): Status: ACTIVE | Noted: 2024-01-02

## 2024-01-03 PROBLEM — D72.819 LEUKOPENIA: Status: ACTIVE | Noted: 2024-01-03

## 2024-01-03 LAB
ANION GAP SERPL CALCULATED.3IONS-SCNC: 16 MMOL/L (ref 3–16)
BASOPHILS # BLD: 0 K/UL (ref 0–0.2)
BASOPHILS NFR BLD: 0.6 %
BUN SERPL-MCNC: 6 MG/DL (ref 7–20)
CALCIUM SERPL-MCNC: 8.8 MG/DL (ref 8.3–10.6)
CHLORIDE SERPL-SCNC: 95 MMOL/L (ref 99–110)
CO2 SERPL-SCNC: 25 MMOL/L (ref 21–32)
CREAT SERPL-MCNC: 0.7 MG/DL (ref 0.6–1.1)
DEPRECATED RDW RBC AUTO: 15.9 % (ref 12.4–15.4)
EOSINOPHIL # BLD: 0 K/UL (ref 0–0.6)
EOSINOPHIL NFR BLD: 0.1 %
GFR SERPLBLD CREATININE-BSD FMLA CKD-EPI: >60 ML/MIN/{1.73_M2}
GLUCOSE SERPL-MCNC: 117 MG/DL (ref 70–99)
HCT VFR BLD AUTO: 31.4 % (ref 36–48)
HGB BLD-MCNC: 10.7 G/DL (ref 12–16)
INR PPP: 1.1 (ref 0.84–1.16)
LIPASE SERPL-CCNC: 121 U/L (ref 13–60)
LYMPHOCYTES # BLD: 0.5 K/UL (ref 1–5.1)
LYMPHOCYTES NFR BLD: 27.7 %
MAGNESIUM SERPL-MCNC: 1.9 MG/DL (ref 1.8–2.4)
MCH RBC QN AUTO: 32.5 PG (ref 26–34)
MCHC RBC AUTO-ENTMCNC: 34.2 G/DL (ref 31–36)
MCV RBC AUTO: 95.2 FL (ref 80–100)
MONOCYTES # BLD: 0.5 K/UL (ref 0–1.3)
MONOCYTES NFR BLD: 28.1 %
NEUTROPHILS # BLD: 0.8 K/UL (ref 1.7–7.7)
NEUTROPHILS NFR BLD: 43.5 %
PATH INTERP BLD-IMP: NORMAL
PATH INTERP BLD-IMP: YES
PLATELET # BLD AUTO: 168 K/UL (ref 135–450)
PLATELET BLD QL SMEAR: ADEQUATE
PMV BLD AUTO: 8 FL (ref 5–10.5)
POTASSIUM SERPL-SCNC: 3.1 MMOL/L (ref 3.5–5.1)
PROTHROMBIN TIME: 14.2 SEC (ref 11.5–14.8)
RBC # BLD AUTO: 3.3 M/UL (ref 4–5.2)
SLIDE REVIEW: ABNORMAL
SODIUM SERPL-SCNC: 136 MMOL/L (ref 136–145)
WBC # BLD AUTO: 1.9 K/UL (ref 4–11)

## 2024-01-03 PROCEDURE — C9113 INJ PANTOPRAZOLE SODIUM, VIA: HCPCS

## 2024-01-03 PROCEDURE — 6370000000 HC RX 637 (ALT 250 FOR IP): Performed by: INTERNAL MEDICINE

## 2024-01-03 PROCEDURE — 83735 ASSAY OF MAGNESIUM: CPT

## 2024-01-03 PROCEDURE — 83690 ASSAY OF LIPASE: CPT

## 2024-01-03 PROCEDURE — 99232 SBSQ HOSP IP/OBS MODERATE 35: CPT | Performed by: INTERNAL MEDICINE

## 2024-01-03 PROCEDURE — 93306 TTE W/DOPPLER COMPLETE: CPT

## 2024-01-03 PROCEDURE — 2580000003 HC RX 258

## 2024-01-03 PROCEDURE — 99239 HOSP IP/OBS DSCHRG MGMT >30: CPT | Performed by: INTERNAL MEDICINE

## 2024-01-03 PROCEDURE — 85610 PROTHROMBIN TIME: CPT

## 2024-01-03 PROCEDURE — 80048 BASIC METABOLIC PNL TOTAL CA: CPT

## 2024-01-03 PROCEDURE — 85025 COMPLETE CBC W/AUTO DIFF WBC: CPT

## 2024-01-03 PROCEDURE — 6360000002 HC RX W HCPCS

## 2024-01-03 RX ORDER — FAMOTIDINE 20 MG/1
20 TABLET, FILM COATED ORAL DAILY
Qty: 60 TABLET | Refills: 3 | COMMUNITY
Start: 2024-01-03

## 2024-01-03 RX ORDER — PROMETHAZINE HYDROCHLORIDE 25 MG/ML
12.5 INJECTION, SOLUTION INTRAMUSCULAR; INTRAVENOUS EVERY 6 HOURS PRN
Status: DISCONTINUED | OUTPATIENT
Start: 2024-01-03 | End: 2024-01-03 | Stop reason: HOSPADM

## 2024-01-03 RX ORDER — POTASSIUM CHLORIDE 20 MEQ/1
20 TABLET, EXTENDED RELEASE ORAL ONCE
Status: COMPLETED | OUTPATIENT
Start: 2024-01-03 | End: 2024-01-03

## 2024-01-03 RX ORDER — POTASSIUM CHLORIDE 20 MEQ/1
40 TABLET, EXTENDED RELEASE ORAL ONCE
Status: COMPLETED | OUTPATIENT
Start: 2024-01-03 | End: 2024-01-03

## 2024-01-03 RX ADMIN — ONDANSETRON 4 MG: 2 INJECTION INTRAMUSCULAR; INTRAVENOUS at 04:09

## 2024-01-03 RX ADMIN — POTASSIUM CHLORIDE 40 MEQ: 1500 TABLET, EXTENDED RELEASE ORAL at 15:41

## 2024-01-03 RX ADMIN — POTASSIUM CHLORIDE 20 MEQ: 1500 TABLET, EXTENDED RELEASE ORAL at 06:40

## 2024-01-03 RX ADMIN — Medication 10 ML: at 08:30

## 2024-01-03 RX ADMIN — SODIUM CHLORIDE, POTASSIUM CHLORIDE, SODIUM LACTATE AND CALCIUM CHLORIDE: 600; 310; 30; 20 INJECTION, SOLUTION INTRAVENOUS at 04:13

## 2024-01-03 RX ADMIN — PANTOPRAZOLE SODIUM 40 MG: 40 INJECTION, POWDER, FOR SOLUTION INTRAVENOUS at 08:29

## 2024-01-03 ASSESSMENT — PAIN SCALES - GENERAL
PAINLEVEL_OUTOF10: 0
PAINLEVEL_OUTOF10: 0

## 2024-01-03 NOTE — PROGRESS NOTES
Blood pressure 109/63, pulse 58, temperature 100.4 °F (38 °C), temperature source Oral, resp. rate 16, height 1.626 m (5' 4\"), weight 65.8 kg (145 lb), last menstrual period 02/11/2023, SpO2 97 %, not currently breastfeeding.    Patient in bed stable, temp 100.2 patient doesn't want tylenol at this time. No current complaints. Call light in reach.

## 2024-01-03 NOTE — DISCHARGE INSTRUCTIONS
Follow up with PCP and repeat labs 3-5 days     _____________________________________         Neutropenia: Care Instructions  Overview  Neutropenia (say \"[id number]-[id number]\") means that your blood has too few neutrophils. These are white blood cells that help protect the body from infection. They do this by killing bacteria.  Neutropenia can be caused by some types of infection. It also can be caused by immune system conditions such as HIV or lupus, a lack of vitamin B12 or folic acid, or an enlarged spleen. Some medicines can cause it too. It is most often caused by treatments for certain health problems, such as chemotherapy and radiation treatment for cancer.  Mild neutropenia usually causes no symptoms. But when it's severe, it increases the risk of infection of your skin and organs. That's because your body can't fight off germs as well as it should.  Follow-up care is a key part of your treatment and safety. Be sure to make and go to all appointments, and call your doctor if you are having problems. It's also a good idea to know your test results and keep a list of the medicines you take.  How can you care for yourself at home?  Take your medicines exactly as prescribed. Call your doctor if you have any problems with your medicine.  Eat a healthy, balanced diet. Eat foods with a lot of fiber. This helps to prevent constipation.  Prevent infections  Take your temperature several times a day, as your doctor suggests. Keep a written record of your temperature readings. Fever is a common symptom of infection. And it may be the only symptom.  Use a soft toothbrush. Do not floss your teeth. Talk with your doctor about other steps to prevent infections in your mouth.  Wash your hands often with soap and water, especially before you eat and after you use the bathroom.  If you are a woman, use sanitary napkins (pads) instead of tampons. Do not douche.  Do not use rectal thermometers or suppositories.  Avoid tasks that  safe for you.  Stay at a weight that's healthy for you. Talk to your doctor if you need help losing weight.  Try to manage stress.  Try to get 7 to 9 hours of sleep each night.  Manage other health problems such as high blood pressure, high cholesterol, and diabetes. If you think you may have a problem with alcohol or drug use, talk to your doctor.  Avoid infections such as COVID-19, colds, and the flu. Get the flu vaccine every year. Get a pneumococcal vaccine. If you have had one before, ask your doctor whether you need another dose. Stay up to date on your COVID-19 vaccines.  Learn how to manage symptoms.  Make a symptom action plan with your doctor. This will help you know what to do when you have an episode of atrial fibrillation.  Use a symptom diary to find what triggers your symptoms. If you know your triggers, you can take steps to avoid them.  Check your pulse regularly if your doctor recommends it. Place two fingers on the artery at the palm side of your wrist, in line with your thumb.  When should you call for help?   Call 911 anytime you think you may need emergency care. For example, call if:    You have symptoms of a heart attack. These may include:  Chest pain or pressure, or a strange feeling in the chest.  Sweating.  Shortness of breath.  Nausea or vomiting.  Pain, pressure, or a strange feeling in the back, neck, jaw, or upper belly or in one or both shoulders or arms.  Lightheadedness or sudden weakness.  A fast or irregular heartbeat.  After you call 911, the  may tell you to chew 1 adult-strength or 2 to 4 low-dose aspirin. Wait for an ambulance. Do not try to drive yourself.     You have symptoms of a stroke. These may include:  Sudden numbness, tingling, weakness, or loss of movement in your face, arm, or leg, especially on only one side of your body.  Sudden vision changes.  Sudden trouble speaking.  Sudden confusion or trouble understanding simple statements.  Sudden problems with

## 2024-01-03 NOTE — PROGRESS NOTES
PROGRESS NOTE  S:38 yrs Patient  admitted on 1/2/2024 with Abdominal pain, epigastric [R10.13]  Hypokalemia [E87.6]  New onset atrial fibrillation (HCC) [I48.91]  Atrial fibrillation with RVR (HCC) [I48.91]  Nausea and vomiting, unspecified vomiting type [R11.2] .  Today, her nausea and vomiting is improved. She reports feeling nauseous with motion only. She is wanting to go home to care for children.     Exam:   Vitals:    01/03/24 1121   BP: 120/81   Pulse: 88   Resp:    Temp: 98.3 °F (36.8 °C)   SpO2: 98%   Generalized: alert, no distress  HEENT: sclera clear, anicteric  Neck: supple, trachea midline  Heart:NSR  Abdomen: soft, NT ND  Extremities: no edema    Medications: Reviewed    Labs:  CBC:   Recent Labs     01/02/24  0600 01/03/24  0455   WBC 3.8* 1.9*   HGB 11.9* 10.7*   HCT 35.5* 31.4*   MCV 95.4 95.2    168     BMP:   Recent Labs     01/02/24  0639 01/03/24  0455    136   K 3.3* 3.1*    95*   CO2 23 25   BUN 7 6*   CREATININE 0.8 0.7     LIVER PROFILE:   Recent Labs     01/02/24  0639 01/03/24  0455   AST 21  --    ALT 12  --    LIPASE 69.0* 121.0*   PROT 6.9  --    BILITOT 0.4  --    ALKPHOS 98  --      Echo, 1/3/24   Left ventricular systolic function is normal with ejection fraction   estimated at 55-60 %.   No regional wall motion abnormalities are noted.   Normal left ventricular diastolic filling pressure.   No significant valvular heart disease.   Normal systolic pulmonary artery pressure (SPAP) estimated at 20 mmHg (RA   pressure 3 mmHg).  Attending Supervising Physician's Attestation Statement  The patient is a 38 y.o. female. I have performed a history and physical examination of the patient. I discussed the case with GABBY Simms    I reviewed the patient's Past Medical History, Past Surgical History, Medications, and Allergies.     Physical Exam:  Vitals:    01/03/24 0400 01/03/24 0830 01/03/24 1121 01/03/24 1535   BP: 135/81

## 2024-01-03 NOTE — PROGRESS NOTES
Shift assessment complete see flow sheet respirations easy and even. Patient denies any needs at this time. Patient had a bout of emesis and  Has been made aware.

## 2024-01-03 NOTE — PROGRESS NOTES
[facility] [place] Daily Progress Note      Admit Date:  1/2/2024    Subjective:  Ms. Mojica is feeling better   Diarrhea is resolved  No chest pain or shortness of breath  Remains in sinus rhythm    ROS:  12 point ROS negative in all areas as listed below except as in [place]  Constitutional, EENT, Cardiovascular, pulmonary,  , Musculoskeletal, skin, neurological, hematological, endocrine, Psychiatric    Past Medical History:   Diagnosis Date    Pancreatitis      Past Surgical History:   Procedure Laterality Date    BREAST ENHANCEMENT SURGERY      BUNIONECTOMY      CHOLECYSTECTOMY      TUBAL LIGATION         Objective:   /73   Pulse 74   Temp 98.7 °F (37.1 °C) (Oral)   Resp 16   Ht 1.626 m (5' 4\")   Wt 66 kg (145 lb 9.6 oz)   LMP 02/11/2023 (Approximate)   SpO2 98%   Breastfeeding No   BMI 24.99 kg/m²     Intake/Output Summary (Last 24 hours) at 1/3/2024 1632  Last data filed at 1/3/2024 1121  Gross per 24 hour   Intake 644 ml   Output 500 ml   Net 144 ml       TELEMETRY: Sinus     Physical Exam:  General: No Respiratory distress, appears well developed and well nourished.   Eyes:  Sclera nonicteric  Nose/Sinuses:  negative findings: nose shows no deformity, asymmetry, or inflammation, nasal mucosa normal, septum midline with no perforation or bleeding  Back:  no pain to palpation  Joint:  no active joint inflammation  Musculoskeletal:  negative  Skin:  Warm and dry  Neck:  Negative for JVD and Carotid Bruits.   Chest:  Clear to auscultation, respiration easy  Cardiovascular:  RRR, S1S2 normal, no murmur, no rub or thrill.  Extremities:   No edema, clubbing, cyanosis,  Neuro: intact    Medications:    sodium chloride flush  5-40 mL IntraVENous 2 times per day    pantoprazole  40 mg IntraVENous Daily      sodium chloride      lactated ringers IV soln 100 mL/hr at 01/03/24 0413     promethazine, sodium chloride flush, sodium chloride, ondansetron **OR** ondansetron, polyethylene glycol,  1/3/2024 4:32 PM

## 2024-01-03 NOTE — PROGRESS NOTES
DC instructions have been reviewed with patient with a verbal understanding. IV removed with no complications. Patient dressed and stated she had all of his belongings and refused wheelchair and walked out of facility with spouse.

## 2024-01-03 NOTE — DISCHARGE INSTR - DIET

## 2024-01-03 NOTE — PROGRESS NOTES
Blood pressure 97/62, pulse 74, temperature 98.2 °F (36.8 °C), temperature source Oral, resp. rate 18, height 1.626 m (5' 4\"), weight 65.8 kg (145 lb), last menstrual period 02/11/2023, SpO2 99 %, not currently breastfeeding.      Shift assessment completed see flow sheet. Patient in bed alert and oriented x4 low fall risk, no bed alarm in use, pt agrees to call out. Patient on room air, showing no signs of distress. Lovenox refused, pt wishes to speak with md in AM about it. Patient has no other needs at this time. Call liht in reach.

## 2024-01-03 NOTE — PROGRESS NOTES
Hand off report given to  MARY Kumar.   Patient is stable showing no signs of distress and has no current needs at this time.   Call light is in reach and bed is in lowest position.    Care is transferred at this time.

## 2024-01-04 LAB
BACTERIA UR CULT: ABNORMAL
BACTERIA UR CULT: ABNORMAL
ORGANISM: ABNORMAL

## 2024-01-04 NOTE — DISCHARGE SUMMARY
Physician Discharge Summary     Patient ID:  Lourdes Mojica  [id number]  38 y.o.  1985    Admit date: 1/2/2024    Discharge date and time: 1/3/2024  6:50 PM     Admitting Physician: Jenniffer Nash MD     Discharge Physician: Jenniffer Nash MD    Admission Diagnoses: Abdominal pain, epigastric [R10.13]  Hypokalemia [E87.6]  New onset atrial fibrillation (HCC) [I48.91]  Atrial fibrillation with RVR (HCC) [I48.91]  Nausea and vomiting, unspecified vomiting type [R11.2]    Discharge Diagnoses: Principal Problem:    New onset atrial fibrillation (HCC)  Active Problems:    Abdominal pain, epigastric    Hypokalemia    Nausea and vomiting  Resolved Problems:    * No resolved hospital problems. *      Admission Condition: fair    Discharged Condition: stable    Indication for Admission:    per HPI  The patient is a 38 y.o. female who [place] [facility] [place] of nausea and vomiting. Patient reports sudden onset of bilious, non-bloody vomiting at 4 AM this morning. Woke her from sleep. Per ED note, she endorsed severe abdominal pain, however, patient denies any recent abdominal pain to me personally. Denies fever, chills, constipation, diarrhea, dysuria, hematuria, vaginal discharge, chest pain, palpitations or SOB. No sick contacts or diet changes. No tobacco or alcohol use. She does use marijuana edibles on a daily basis. Workup in ED significant for new onset atrial fibrillation with rapid ventricular response, and intractable nausea and vomiting of unclear etiology. Admitted for further evaluation and management in consultation with cardiology and GI.        Hospital Course:     #paroxysmal Atrial fibrillation RVR, new onset   -with rates into 150s  -trop negative  -EKG- afib  without acute ischemic change   -s/p cadizem bolus --> placed on cardizem gtt   -started initially on full dose lovenox   -TSH  WNL  - ECHO - normal EF  -cardiology consulted      [id number] Score for Atrial Fibrillation Stroke  taking these medications      famotidine 20 MG tablet  Commonly known as: PEPCID  Take 1 tablet by mouth daily            CONTINUE taking these medications      traZODone 50 MG tablet  Commonly known as: DESYREL            STOP taking these medications      mirtazapine 15 MG tablet  Commonly known as: REMERON     naproxen 500 MG tablet  Commonly known as: Naprosyn     ondansetron 4 MG disintegrating tablet  Commonly known as: Zofran ODT     VIVITROL IM               Where to Get Your Medications        You can get these medications from any pharmacy    You don't need a prescription for these medications  famotidine 20 MG tablet       Activity: activity as tolerated  Diet: regular diet  Wound Care: as directed    Follow-up with PCP in 3-5 days .   Rpt CBc recommended . To monitor WBC, neutrophil count .     Pt advised to avoid marijuana          Total time today 38 minutes. > 50 % time dominated by counseling/ coordination of care         Signed:  Jenniffer Nash MD  1/3/2024

## 2024-04-22 ENCOUNTER — HOSPITAL ENCOUNTER (OUTPATIENT)
Age: 39
Discharge: HOME | End: 2024-04-22
Payer: COMMERCIAL

## 2024-04-22 LAB — C DIFF TOX A+B STL QL IA: NORMAL

## 2024-04-22 PROCEDURE — 82653 EL-1 FECAL QUANTITATIVE: CPT

## 2024-04-22 PROCEDURE — 82705 FATS/LIPIDS FECES QUAL: CPT

## 2024-04-22 PROCEDURE — 83993 ASSAY FOR CALPROTECTIN FECAL: CPT

## 2024-04-22 PROCEDURE — 87328 CRYPTOSPORIDIUM AG IA: CPT

## 2024-04-22 PROCEDURE — 87506 IADNA-DNA/RNA PROBE TQ 6-11: CPT

## 2024-04-22 PROCEDURE — 87324 CLOSTRIDIUM AG IA: CPT

## 2024-04-22 PROCEDURE — 87336 ENTAMOEB HIST DISPR AG IA: CPT

## 2024-04-22 PROCEDURE — 87449 NOS EACH ORGANISM AG IA: CPT

## 2024-04-23 LAB
CRYPTOSP AG STL QL IA: NORMAL
E HISTOLYT AG STL QL IA: NORMAL
G LAMBLIA AG STL QL IA: NORMAL
GI PATHOGENS PNL STL NAA+PROBE: NORMAL

## 2024-04-24 LAB
FAT STL QL: ABNORMAL
NEUTRAL FAT STL QL: ABNORMAL

## 2024-04-25 LAB — CALPROTECTIN STL-MCNT: 11 UG/G

## 2024-04-26 LAB — ELASTASE PANC STL-MCNT: 252 UG/G
